# Patient Record
Sex: MALE | Race: BLACK OR AFRICAN AMERICAN | Employment: UNEMPLOYED | URBAN - METROPOLITAN AREA
[De-identification: names, ages, dates, MRNs, and addresses within clinical notes are randomized per-mention and may not be internally consistent; named-entity substitution may affect disease eponyms.]

---
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
## 2023-01-28 ENCOUNTER — PATIENT MESSAGE (OUTPATIENT)
Dept: FAMILY MEDICINE CLINIC | Age: 18
End: 2023-01-28

## 2023-01-28 DIAGNOSIS — B00.9 RECURRENT HERPES SIMPLEX: Primary | ICD-10-CM

## 2023-01-30 RX ORDER — ACYCLOVIR 200 MG/1
200 CAPSULE ORAL
Qty: 25 CAPSULE | Refills: 0 | Status: SHIPPED | OUTPATIENT
Start: 2023-01-30 | End: 2023-02-04

## 2023-01-30 NOTE — TELEPHONE ENCOUNTER
From: Elsie Rosario  To: Dr. Queenie Alan: 1/28/2023 12:41 PM EST  Subject: Eamon Taylor you think its possible for me to get a new prescription of acyclovir or to get it added.  To have on hand dont know if I would need to have a visit but phone visit would be best.

## 2023-02-01 ENCOUNTER — HOSPITAL ENCOUNTER (EMERGENCY)
Age: 18
Discharge: HOME | End: 2023-02-02
Attending: EMERGENCY MEDICINE
Payer: COMMERCIAL

## 2023-02-01 VITALS
RESPIRATION RATE: 20 BRPM | BODY MASS INDEX: 22.32 KG/M2 | OXYGEN SATURATION: 100 % | DIASTOLIC BLOOD PRESSURE: 93 MMHG | HEART RATE: 100 BPM | TEMPERATURE: 98.2 F | WEIGHT: 160 LBS | SYSTOLIC BLOOD PRESSURE: 144 MMHG

## 2023-02-01 DIAGNOSIS — R10.13 ABDOMINAL PAIN, EPIGASTRIC: Primary | ICD-10-CM

## 2023-02-01 DIAGNOSIS — K29.00 ACUTE GASTRITIS WITHOUT HEMORRHAGE, UNSPECIFIED GASTRITIS TYPE: ICD-10-CM

## 2023-02-01 LAB
ABSOLUTE EOS #: 0.07 K/UL (ref 0–0.44)
ABSOLUTE IMMATURE GRANULOCYTE: <0.03 K/UL (ref 0–0.3)
ABSOLUTE LYMPH #: 1.7 K/UL (ref 1.2–5.2)
ABSOLUTE MONO #: 0.92 K/UL (ref 0.1–1.4)
BASOPHILS # BLD: 0 % (ref 0–2)
BASOPHILS ABSOLUTE: 0.03 K/UL (ref 0–0.2)
EOSINOPHILS RELATIVE PERCENT: 1 % (ref 1–4)
HCT VFR BLD AUTO: 47.5 % (ref 40.7–50.3)
HGB BLD-MCNC: 15.3 G/DL (ref 13–17)
IMMATURE GRANULOCYTES: 0 %
LYMPHOCYTES # BLD: 23 % (ref 25–45)
MCH RBC QN AUTO: 26.5 PG (ref 25.2–33.5)
MCHC RBC AUTO-ENTMCNC: 32.2 G/DL (ref 28.4–34.8)
MCV RBC AUTO: 82.2 FL (ref 82.6–102.9)
MONOCYTES # BLD: 12 % (ref 2–8)
NRBC AUTOMATED: 0 PER 100 WBC
PDW BLD-RTO: 13.2 % (ref 11.8–14.4)
PLATELET # BLD AUTO: 237 K/UL (ref 138–453)
PMV BLD AUTO: 10.6 FL (ref 8.1–13.5)
RBC # BLD: 5.78 M/UL (ref 4.21–5.77)
RBC # BLD: ABNORMAL 10*6/UL
SEG NEUTROPHILS: 64 % (ref 34–64)
SEGMENTED NEUTROPHILS ABSOLUTE COUNT: 4.67 K/UL (ref 1.8–8)
WBC # BLD AUTO: 7.4 K/UL (ref 4.5–13.5)

## 2023-02-01 PROCEDURE — 96374 THER/PROPH/DIAG INJ IV PUSH: CPT

## 2023-02-01 PROCEDURE — 83690 ASSAY OF LIPASE: CPT

## 2023-02-01 PROCEDURE — 6360000002 HC RX W HCPCS: Performed by: HEALTH CARE PROVIDER

## 2023-02-01 PROCEDURE — 83735 ASSAY OF MAGNESIUM: CPT

## 2023-02-01 PROCEDURE — 83605 ASSAY OF LACTIC ACID: CPT

## 2023-02-01 PROCEDURE — 96375 TX/PRO/DX INJ NEW DRUG ADDON: CPT

## 2023-02-01 PROCEDURE — 85025 COMPLETE CBC W/AUTO DIFF WBC: CPT

## 2023-02-01 PROCEDURE — 99284 EMERGENCY DEPT VISIT MOD MDM: CPT

## 2023-02-01 PROCEDURE — 80053 COMPREHEN METABOLIC PANEL: CPT

## 2023-02-01 PROCEDURE — 2580000003 HC RX 258: Performed by: HEALTH CARE PROVIDER

## 2023-02-01 RX ORDER — ONDANSETRON 2 MG/ML
4 INJECTION INTRAMUSCULAR; INTRAVENOUS ONCE
Status: COMPLETED | OUTPATIENT
Start: 2023-02-01 | End: 2023-02-01

## 2023-02-01 RX ORDER — SODIUM CHLORIDE, SODIUM LACTATE, POTASSIUM CHLORIDE, AND CALCIUM CHLORIDE .6; .31; .03; .02 G/100ML; G/100ML; G/100ML; G/100ML
1000 INJECTION, SOLUTION INTRAVENOUS ONCE
Status: COMPLETED | OUTPATIENT
Start: 2023-02-01 | End: 2023-02-02

## 2023-02-01 RX ORDER — MORPHINE SULFATE 4 MG/ML
4 INJECTION, SOLUTION INTRAMUSCULAR; INTRAVENOUS ONCE
Status: COMPLETED | OUTPATIENT
Start: 2023-02-01 | End: 2023-02-01

## 2023-02-01 RX ADMIN — ONDANSETRON 4 MG: 2 INJECTION INTRAMUSCULAR; INTRAVENOUS at 23:51

## 2023-02-01 RX ADMIN — SODIUM CHLORIDE, POTASSIUM CHLORIDE, SODIUM LACTATE AND CALCIUM CHLORIDE 1000 ML: 600; 310; 30; 20 INJECTION, SOLUTION INTRAVENOUS at 23:45

## 2023-02-01 RX ADMIN — MORPHINE SULFATE 4 MG: 4 INJECTION INTRAVENOUS at 23:52

## 2023-02-01 ASSESSMENT — PAIN DESCRIPTION - LOCATION: LOCATION: ABDOMEN

## 2023-02-01 ASSESSMENT — PAIN - FUNCTIONAL ASSESSMENT: PAIN_FUNCTIONAL_ASSESSMENT: 0-10

## 2023-02-01 ASSESSMENT — PAIN SCALES - GENERAL: PAINLEVEL_OUTOF10: 7

## 2023-02-02 ENCOUNTER — APPOINTMENT (OUTPATIENT)
Dept: GENERAL RADIOLOGY | Age: 18
End: 2023-02-02
Payer: COMMERCIAL

## 2023-02-02 LAB
ALBUMIN SERPL-MCNC: 4.8 G/DL (ref 3.5–5.2)
ALBUMIN/GLOBULIN RATIO: 1.5 (ref 1–2.5)
ALP SERPL-CCNC: 75 U/L (ref 40–129)
ALT SERPL-CCNC: 13 U/L (ref 5–41)
ANION GAP SERPL CALCULATED.3IONS-SCNC: 12 MMOL/L (ref 9–17)
AST SERPL-CCNC: 19 U/L
BILIRUB SERPL-MCNC: 0.5 MG/DL (ref 0.3–1.2)
BILIRUBIN URINE: NEGATIVE
BUN SERPL-MCNC: 13 MG/DL (ref 6–20)
CALCIUM SERPL-MCNC: 9.3 MG/DL (ref 8.6–10.4)
CASTS UA: NORMAL /LPF (ref 0–8)
CHLORIDE SERPL-SCNC: 105 MMOL/L (ref 98–107)
CO2 SERPL-SCNC: 27 MMOL/L (ref 20–31)
COLOR: YELLOW
CREAT SERPL-MCNC: 1.42 MG/DL (ref 0.7–1.2)
EPITHELIAL CELLS UA: NORMAL /HPF (ref 0–5)
GFR SERPL CREATININE-BSD FRML MDRD: >60 ML/MIN/1.73M2
GLUCOSE SERPL-MCNC: 85 MG/DL (ref 70–99)
GLUCOSE UR STRIP.AUTO-MCNC: NEGATIVE MG/DL
KETONES UR STRIP.AUTO-MCNC: ABNORMAL MG/DL
LACTIC ACID, WHOLE BLOOD: 1.1 MMOL/L (ref 0.7–2.1)
LEUKOCYTE ESTERASE UR QL STRIP.AUTO: NEGATIVE
LIPASE SERPL-CCNC: 19 U/L (ref 13–60)
MAGNESIUM SERPL-MCNC: 1.9 MG/DL (ref 1.6–2.6)
NITRITE UR QL STRIP.AUTO: NEGATIVE
POTASSIUM SERPL-SCNC: 4.2 MMOL/L (ref 3.7–5.3)
PROT SERPL-MCNC: 8.1 G/DL (ref 6.4–8.3)
PROT UR STRIP.AUTO-MCNC: 5.5 MG/DL (ref 5–8)
PROT UR STRIP.AUTO-MCNC: ABNORMAL MG/DL
RBC CLUMPS #/AREA URNS AUTO: NORMAL /HPF (ref 0–4)
SODIUM SERPL-SCNC: 144 MMOL/L (ref 135–144)
SPECIFIC GRAVITY UA: 1.01 (ref 1–1.03)
TURBIDITY: CLEAR
URINE HGB: ABNORMAL
UROBILINOGEN, URINE: NORMAL
WBC UA: NORMAL /HPF (ref 0–5)

## 2023-02-02 PROCEDURE — 71046 X-RAY EXAM CHEST 2 VIEWS: CPT

## 2023-02-02 PROCEDURE — 2580000003 HC RX 258: Performed by: HEALTH CARE PROVIDER

## 2023-02-02 PROCEDURE — A4216 STERILE WATER/SALINE, 10 ML: HCPCS | Performed by: HEALTH CARE PROVIDER

## 2023-02-02 PROCEDURE — 81001 URINALYSIS AUTO W/SCOPE: CPT

## 2023-02-02 PROCEDURE — 6360000002 HC RX W HCPCS: Performed by: HEALTH CARE PROVIDER

## 2023-02-02 PROCEDURE — C9113 INJ PANTOPRAZOLE SODIUM, VIA: HCPCS | Performed by: HEALTH CARE PROVIDER

## 2023-02-02 PROCEDURE — 2500000003 HC RX 250 WO HCPCS: Performed by: HEALTH CARE PROVIDER

## 2023-02-02 RX ORDER — ONDANSETRON 4 MG/1
4 TABLET, ORALLY DISINTEGRATING ORAL EVERY 8 HOURS PRN
Qty: 21 TABLET | Refills: 0 | Status: SHIPPED | OUTPATIENT
Start: 2023-02-02 | End: 2023-02-09

## 2023-02-02 RX ORDER — FAMOTIDINE 10 MG/ML
20 INJECTION, SOLUTION INTRAVENOUS ONCE
Status: COMPLETED | OUTPATIENT
Start: 2023-02-02 | End: 2023-02-02

## 2023-02-02 RX ORDER — FAMOTIDINE 10 MG/ML
INJECTION, SOLUTION INTRAVENOUS
Status: DISCONTINUED
Start: 2023-02-02 | End: 2023-02-02 | Stop reason: HOSPADM

## 2023-02-02 RX ORDER — FAMOTIDINE 20 MG/1
20 TABLET, FILM COATED ORAL 2 TIMES DAILY
Qty: 28 TABLET | Refills: 0 | Status: SHIPPED | OUTPATIENT
Start: 2023-02-02 | End: 2023-02-16

## 2023-02-02 RX ADMIN — FAMOTIDINE 20 MG: 10 INJECTION, SOLUTION INTRAVENOUS at 00:47

## 2023-02-02 RX ADMIN — SODIUM CHLORIDE 40 MG: 9 INJECTION, SOLUTION INTRAMUSCULAR; INTRAVENOUS; SUBCUTANEOUS at 00:05

## 2023-02-02 ASSESSMENT — ENCOUNTER SYMPTOMS
SHORTNESS OF BREATH: 0
DIARRHEA: 0
SORE THROAT: 0
TROUBLE SWALLOWING: 0
BACK PAIN: 0
ABDOMINAL PAIN: 1
NAUSEA: 1
VOMITING: 0
BLOOD IN STOOL: 0
CONSTIPATION: 0

## 2023-02-02 NOTE — ED NOTES
The following labs were labeled with appropriate pt sticker and tubed to lab:     [] Blue     [x] Lavender   [] on ice  [x] Green/yellow  [x] Green/black [x] on ice  [] [place]  [] on ice  [x] Yellow  [] Red  [] Type/ Screen  [] ABG  [] VBG    [] COVID-19 swab    [] Rapid  [] PCR  [] Flu swab  [] Peds Viral Panel     [] Urine Sample  [] Fecal Sample  [] Pelvic Cultures  [] Blood Cultures  [] X 2  [] STREP Cultures       Aaron Cristobal LPN  94/66/01 5458

## 2023-02-02 NOTE — ED NOTES
The following labs were labeled with appropriate pt sticker and tubed to lab:     [] Blue     [] Lavender   [] on ice  [] Green/yellow  [] Green/black [] on ice  [] Iesha Keanu  [] on ice  [] Yellow  [] Red  [] Type/ Screen  [] ABG  [] VBG    [] COVID-19 swab    [] Rapid  [] PCR  [] Flu swab  [] Peds Viral Panel     [x] Urine Sample  [] Fecal Sample  [] Pelvic Cultures  [] Blood Cultures  [] X 2  [] STREP Cultures       Chad Johnson, TEDN  51/82/27 1108

## 2023-02-02 NOTE — ED TRIAGE NOTES
20 yo male arrived to ED with c/o abdominal pain, nausea, and black stool that started on Monday. Pt states he does have a history if ulcers. Pt A&O x 4, does not appear in acute distress, RR even and unlabored, resting comfortably on stretcher with eyes open and call light in reach. Vital signs obtained, medical hx and allergies reviewed with pt. Initial assessment performed by physician, Maciej Moncada will carry out initial orders/tasks and reassess pt.

## 2023-02-02 NOTE — ED NOTES
Pt to [address] [facility] [address] [facility], LPN  59/47/24 8425

## 2023-02-02 NOTE — DISCHARGE INSTRUCTIONS
You are seen in the emergency department due to concerns for abdominal pain. Based on our evaluation, you are safe for discharge. Your symptoms are most likely related to irritation of the stomach lining, called gastritis. Please take your medications exactly as prescribed. Please follow-up with your primary care doctor within 1 week. If you experience worsening pain, nausea, vomiting, fevers, chills, chest pain, shortness of breath, urinary symptoms, bloody stools, or any other symptom you find concerning, please return to the emergency department immediately for reevaluation.

## 2023-02-02 NOTE — ED PROVIDER NOTES
[address] [facility]     Emergency Department     Faculty Attestation    I performed a history and physical examination of the patient and discussed management with the resident. I have reviewed and agree with the residents findings including all diagnostic interpretations, and treatment plans as written. Any areas of disagreement are noted on the chart. I was personally present for the key portions of any procedures. I have documented in the chart those procedures where I was not present during the key portions. I have reviewed the emergency nurses triage note. I agree with the chief complaint, past medical history, past surgical history, allergies, medications, social and family history as documented unless otherwise noted below. Documentation of the HPI, Physical Exam and Medical Decision Making performed by scribcheyanne is based on my personal performance of the HPI, PE and MDM. For Physician Assistant/ Nurse Practitioner cases/documentation I have personally evaluated this patient and have completed at least one if not all key elements of the E/M (history, physical exam, and MDM). Additional findings are as noted. 18 yo M c/o luq pain for 2 days, notes dark stool, not taking nsaid or fe   No fever, c/o nausea but no vomiting,   PE Anali WIGGINS escort for exam: gcs 15 tender luq with guarding, no mass, no distension, no rebound,   Dr Mayito Hernandez heme - rectal    -lipase 19, wbc stable, AN gap stable, s/s improved, tolerating po, given strict return instruction,   Has pcp follow up, I feel pt having gastritis type process    EKG Interpretation    Interpreted by me      CRITICAL CARE: There was a high probability of clinically significant/life threatening deterioration in this patient's condition which required my urgent intervention. Total critical care time was 5 minutes. This excludes any time for separately reportable procedures.        Jan Tanner [address] [facility], DO  02/01/23 [facility], DO  02/01/23 [facility], DO  02/02/23 0140       Claudia Butterfield, DO  02/02/23 9905

## 2023-02-02 NOTE — ED NOTES
Pt back from [address] [facility] [address] Zaina Garrett, LPN  02/62/45 5416

## 2023-02-02 NOTE — PROGRESS NOTES
SPIRITUAL CARE DEPARTMENT - [facility] [address]  PROGRESS NOTE    Shift date: 2/1/23  Shift day: Wednesday   Shift # 2    Room # 06/06   Name: Rl Morales                Taoist:    Place of Amish:     Referral: Routine Visit    Admit Date & Time: 2/1/2023 11:25 PM    Assessment:  Rl Morales is a 19 y.o. male     Intervention:  Writer introduced self and title as . Patient did not appear to mind  presence. Patient appeared calm and coping and had visitor in room for support. Writer offered space for patient  to express feelings, needs, and concerns and provided a ministry presence. Outcome:  Patient appeared receptive to  presence while coping with his current hospital visit. Plan:  Chaplains will remain available to offer spiritual and emotional support as needed.       Electronically signed by Anderson Farfan on 2/2/2023 at 12:18 AM.  [facility]  [phone]

## 2023-02-02 NOTE — ED PROVIDER NOTES
[facility]  ED  Emergency Department Encounter  Emergency Medicine Resident     Pt Name:Ariel Gutierrez  MRN: [id number]  [place] 2005  Date of evaluation: 2/2/23  PCP:  Merari Lindsey MD  Note Started: 12:17 AM EST      CHIEF COMPLAINT       Chief Complaint   Patient presents with    Abdominal Pain    Melena    Nausea       HISTORY OF PRESENT ILLNESS  (Location/Symptom, Timing/Onset, Context/Setting, Quality, Duration, Modifying Factors, Severity.)      Kilo Santos is a 19 y.o. male with history of peptic ulcer disease who presents with abdominal pain. Started 3 days ago. Pain is constant, burning and 7/10 in severity. Localized to the left upper and lower quadrants without radiation. No exacerbating or alleviating factors. Patient states that he has been taking Pepto-Bismol, which has not alleviated the symptoms. Also complains of black stools. States he had black stools previously when he had bleeding ulcer. He is not currently on any PPIs or H2 blockers for his PUD. Also complains of nausea, but no vomiting. He denies any fevers, chills, headache, dizziness, chest pain, shortness of breath, constipation, diarrhea, weakness, numbness, or tingling. PAST MEDICAL / SURGICAL / SOCIAL / FAMILY HISTORY      has a past medical history of Abdominal pain, Acne, and Asthma. has a past surgical history that includes Upper gastrointestinal endoscopy (04/04/2019) and Upper gastrointestinal endoscopy (N/A, 4/4/2019).       Social History     Socioeconomic History    Marital status: Single     Spouse name: Not on file    Number of children: Not on file    Years of education: Not on file    Highest education level: Not on file   Occupational History    Not on file   Tobacco Use    Smoking status: Never     Passive exposure: Yes    Smokeless tobacco: Never   Vaping Use    Vaping Use: Never used   Substance and Sexual Activity    Alcohol use: Never    Drug use: No    Sexual activity: Never   Other Topics Concern    Not on file   Social History Narrative    Not on file     Social Determinants of Health     Financial Resource Strain: Not on file   Food Insecurity: Not on file   Transportation Needs: Not on file   Physical Activity: Insufficiently Active    Days of Exercise per Week: 5 days    Minutes of Exercise per Session: 20 min   Stress: Not on file   Social Connections: Not on file   Intimate Partner Violence: Not At Risk    Fear of Current or Ex-Partner: No    Emotionally Abused: No    Physically Abused: No    Sexually Abused: No   Housing Stability: Not on file       Family History   Problem Relation Age of Onset    No Known Problems Mother     Asthma Father        Allergies:  No known allergies and Seasonal    Home Medications:  Prior to Admission medications    Medication Sig Start Date End Date Taking?  Authorizing Provider   famotidine (PEPCID) 20 MG tablet Take 1 tablet by mouth 2 times daily for 14 days 2/2/23 2/16/23 Yes Zenobia Corcoran MD   ondansetron (ZOFRAN-ODT) 4 MG disintegrating tablet Take 1 tablet by mouth every 8 hours as needed for Nausea or Vomiting 2/2/23 2/9/23 Yes Zenobia Corcoran MD   acyclovir (ZOVIRAX) 200 MG capsule Take 1 capsule by mouth 5 times daily for 5 days 1/30/23 2/4/23  Keyon Bridges MD   docusate sodium (DOK) 100 MG capsule Take 1 capsule by mouth 2 times daily 12/4/22   Erwin Gagnon MD   polyethylene glycol (MIRALAX) 17 GM/SCOOP powder Take 17 g by mouth daily As directed to achieve 2-3 soft stool daily 12/4/22   Erwin Gagnon MD   acetaminophen (TYLENOL) 325 MG tablet Take 1 tablet by mouth every 6 hours as needed for Pain 12/4/22   Erwin Gagnon MD   ibuprofen (IBU) 400 MG tablet Take 1 tablet by mouth every 6 hours as needed for Pain 12/4/22   Erwin Gagnon MD   COVID-19 Antigen Test (QUICKVUE SARS ANTIGEN TEST) KIT Needs home covid 19 test 8/23/22   Keyon Bridges MD   albuterol sulfate HFA (PROVENTIL HFA) 108 (90 Base) MCG/ACT inhaler Inhale 2 puffs into the lungs every 6 hours as needed for Wheezing or Shortness of Breath 8/23/22   Claria Mcardle, MD   doxycycline hyclate (VIBRAMYCIN) 100 MG capsule Take one tablet PO daily 9/21/21   Alize Flores MD   tretinoin (RETIN-A) 0.025 % cream Apply pea sized amount to face nightly 9/21/21   Poli Matthew MD   tacrolimus (PROTOPIC) 0.1 % ointment Apply to affected areas on face twice daily 6/11/21   Poli Matthew MD   erythromycin with ethanol (EMGEL) 2 % gel Apply topically daily. 12/31/19   Marques Matthews MD         REVIEW OF SYSTEMS       Review of Systems   Constitutional:  Positive for appetite change. Negative for chills and fever. HENT:  Negative for sore throat and trouble swallowing. Respiratory:  Negative for shortness of breath. Cardiovascular:  Negative for chest pain. Gastrointestinal:  Positive for abdominal pain and nausea. Negative for blood in stool, constipation, diarrhea and vomiting. Genitourinary:  Negative for decreased urine volume, difficulty urinating and dysuria. Musculoskeletal:  Negative for back pain. Allergic/Immunologic: Negative for immunocompromised state. Neurological:  Negative for dizziness, light-headedness and headaches. Hematological:  Does not bruise/bleed easily. PHYSICAL EXAM      INITIAL VITALS:   BP (!) 144/93   Pulse 100   Temp 98.2 °F (36.8 °C)   Resp 20   Wt 160 lb (72.6 kg)   SpO2 100%   BMI 22.32 kg/m²     Physical Exam  Vitals reviewed. Constitutional:       General: He is not in acute distress. Appearance: Normal appearance. He is not ill-appearing. HENT:      Head: Normocephalic and atraumatic. Right Ear: Tympanic membrane, ear canal and external ear normal.      Left Ear: Tympanic membrane, ear canal and external ear normal.      Nose: Nose normal. No rhinorrhea. Mouth/Throat:      Mouth: Mucous membranes are moist.      Pharynx: Oropharynx is clear.    Eyes: Conjunctiva/sclera: Conjunctivae normal.      Pupils: Pupils are equal, round, and reactive to light. Cardiovascular:      Rate and Rhythm: Normal rate and regular rhythm. Pulses: Normal pulses. Heart sounds: Normal heart sounds. Pulmonary:      Effort: Pulmonary effort is normal.      Breath sounds: Normal breath sounds. Abdominal:      General: There is no distension. Palpations: Abdomen is soft. Tenderness: There is abdominal tenderness in the left upper quadrant. Comments: Moderate left upper quadrant tenderness with voluntary guarding, but no rebound or rigidity. On rectal exam, stool is black, but guaiac is negative. Genitourinary:     Prostate: Normal.      Rectum: Guaiac result negative. No tenderness. Musculoskeletal:      Cervical back: Normal range of motion and neck supple. Right lower leg: No edema. Left lower leg: No edema. Skin:     General: Skin is warm and dry. Capillary Refill: Capillary refill takes less than 2 seconds. Neurological:      General: No focal deficit present. Mental Status: He is alert and oriented to person, place, and time. Psychiatric:         Mood and Affect: Mood normal.         Behavior: Behavior normal.         DDX/DIAGNOSTIC RESULTS / EMERGENCY DEPARTMENT COURSE / MDM     Medical Decision Making  25-year-old male with left upper quadrant abdominal pain and history of peptic ulcer disease. Recurrent ulcer versus gastritis versus pancreatitis versus enteritis, or colitis. Will obtain CMP, CBC, lactate and CBC. Will obtain chest x-ray to evaluate for intra-abdominal free air. Will give morphine for pain, Zofran for nausea. We will give 40 of Protonix and then recheck. Blackened stool with negative black likely secondary to bismuth use. Additional work-up and treatment pending the results of the studies. Amount and/or Complexity of Data Reviewed  Labs: ordered. Radiology: ordered.     Risk  Prescription drug management. EMERGENCY DEPARTMENT COURSE:      ED Course as of 02/02/23 0130   Thu Feb 02, 2023   0128 Patient reports significant improvement of symptoms following Protonix and Pepcid. Does have mild elevation in creatinine, but normal GFR. Likely due to mild dehydration. We will plan for discharge at this time. Will give prescriptions for Pepcid and Zofran and instructed patient to follow-up with his primary care provider within 1 week. Discussed return precautions. Patient acknowledged understanding and agreement with plan. [GG]      ED Course User Index  [GG] Anish Livingston MD       PROCEDURES:  None    CONSULTS:  None    CRITICAL CARE:  None    FINAL IMPRESSION      1. Abdominal pain, epigastric    2.  Acute gastritis without hemorrhage, unspecified gastritis type          DISPOSITION / PLAN     DISPOSITION Decision To Discharge 02/02/2023 01:28:24 AM      PATIENT REFERRED TO:  [facility]  [address] [place] [address]  [phone]    If symptoms worsen    Jordon Barron MD  [address] [place] [address]  [address] [place] [address]  [phone]    In 1 week      DISCHARGE MEDICATIONS:  New Prescriptions    FAMOTIDINE (PEPCID) 20 MG TABLET    Take 1 tablet by mouth 2 times daily for 14 days    ONDANSETRON (ZOFRAN-ODT) 4 MG DISINTEGRATING TABLET    Take 1 tablet by mouth every 8 hours as needed for Nausea or Vomiting       Anish Livingston MD  Emergency Medicine Resident    (Please note that portions of thisnote were completed with a voice recognition program.  Efforts were made to edit the dictations but occasionally words are mis-transcribed.)       Anish Livingston MD  Resident  02/02/23 7318

## 2023-03-03 ENCOUNTER — PATIENT MESSAGE (OUTPATIENT)
Dept: FAMILY MEDICINE CLINIC | Age: 18
End: 2023-03-03

## 2023-03-03 DIAGNOSIS — B00.9 RECURRENT HERPES SIMPLEX: Primary | ICD-10-CM

## 2023-03-04 RX ORDER — VALACYCLOVIR HYDROCHLORIDE 500 MG/1
2000 TABLET, FILM COATED ORAL EVERY 12 HOURS
Qty: 8 TABLET | Refills: 3 | Status: SHIPPED | OUTPATIENT
Start: 2023-03-04

## 2023-03-04 RX ORDER — ACYCLOVIR 50 MG/G
OINTMENT TOPICAL
Qty: 30 G | Refills: 1 | Status: SHIPPED | OUTPATIENT
Start: 2023-03-04

## 2023-03-04 NOTE — TELEPHONE ENCOUNTER
From: Laurent Gray  To: Dr. Alma Rosa Ochoa: 3/3/2023 11:37 PM EST  Subject: Prescription     You think its possible to get an prescription of acyclovir ointment or something stronger. I think this version would be more effective and longer lasting than the other version I would like to have it on hand before the effects.

## 2024-03-18 DIAGNOSIS — J45.20 MILD INTERMITTENT ASTHMA WITHOUT COMPLICATION: ICD-10-CM

## 2024-03-18 DIAGNOSIS — B00.9 RECURRENT HERPES SIMPLEX: ICD-10-CM

## 2024-03-18 RX ORDER — ACYCLOVIR 50 MG/G
OINTMENT TOPICAL
Qty: 30 G | Refills: 1 | OUTPATIENT
Start: 2024-03-18

## 2024-03-18 RX ORDER — ALBUTEROL SULFATE 90 UG/1
2 AEROSOL, METERED RESPIRATORY (INHALATION) EVERY 6 HOURS PRN
Qty: 18 G | Refills: 1 | OUTPATIENT
Start: 2024-03-18

## 2024-03-18 RX ORDER — VALACYCLOVIR HYDROCHLORIDE 500 MG/1
2000 TABLET, FILM COATED ORAL EVERY 12 HOURS
Qty: 8 TABLET | Refills: 3 | OUTPATIENT
Start: 2024-03-18

## 2024-03-25 NOTE — TELEPHONE ENCOUNTER
Please Approve or Refuse.  Send to Pharmacy per Pt's Request: 's     Next Visit Date:  called pt lvm.   Last Visit Date: 8/23/2022    No results found for: \"LABA1C\"          ( goal A1C is < 7)   BP Readings from Last 3 Encounters:   02/01/23 (!) 144/93   12/03/22 (!) 141/90   10/25/22 131/79          (goal 120/80)  BUN   Date Value Ref Range Status   02/01/2023 13 6 - 20 mg/dL Final     Creatinine   Date Value Ref Range Status   02/01/2023 1.42 (H) 0.70 - 1.20 mg/dL Final     Potassium   Date Value Ref Range Status   02/01/2023 4.2 3.7 - 5.3 mmol/L Final

## 2024-09-03 ENCOUNTER — HOSPITAL ENCOUNTER (EMERGENCY)
Age: 19
Discharge: HOME | End: 2024-09-03
Attending: STUDENT IN AN ORGANIZED HEALTH CARE EDUCATION/TRAINING PROGRAM
Payer: COMMERCIAL

## 2024-09-03 VITALS
OXYGEN SATURATION: 99 % | DIASTOLIC BLOOD PRESSURE: 75 MMHG | BODY MASS INDEX: 23.1 KG/M2 | HEART RATE: 63 BPM | TEMPERATURE: 98.4 F | RESPIRATION RATE: 16 BRPM | SYSTOLIC BLOOD PRESSURE: 141 MMHG | WEIGHT: 165 LBS | HEIGHT: 71 IN

## 2024-09-03 DIAGNOSIS — N34.2 URETHRITIS: Primary | ICD-10-CM

## 2024-09-03 LAB
BACTERIA URNS QL MICRO: ABNORMAL
BILIRUB UR QL STRIP: NEGATIVE
CLARITY UR: CLEAR
COLOR UR: YELLOW
EPI CELLS #/AREA URNS HPF: ABNORMAL /HPF (ref 0–5)
GLUCOSE UR STRIP-MCNC: NEGATIVE MG/DL
HGB UR QL STRIP.AUTO: ABNORMAL
KETONES UR STRIP-MCNC: NEGATIVE MG/DL
LEUKOCYTE ESTERASE UR QL STRIP: NEGATIVE
NITRITE UR QL STRIP: NEGATIVE
PH UR STRIP: 7 [PH] (ref 5–8)
PROT UR STRIP-MCNC: NEGATIVE MG/DL
RBC #/AREA URNS HPF: ABNORMAL /HPF (ref 0–2)
SP GR UR STRIP: 1.02 (ref 1–1.03)
UROBILINOGEN UR STRIP-ACNC: NORMAL EU/DL (ref 0–1)
WBC #/AREA URNS HPF: ABNORMAL /HPF (ref 0–5)

## 2024-09-03 PROCEDURE — 81001 URINALYSIS AUTO W/SCOPE: CPT

## 2024-09-03 PROCEDURE — 87591 N.GONORRHOEAE DNA AMP PROB: CPT

## 2024-09-03 PROCEDURE — 99283 EMERGENCY DEPT VISIT LOW MDM: CPT

## 2024-09-03 PROCEDURE — 87491 CHLMYD TRACH DNA AMP PROBE: CPT

## 2024-09-03 RX ORDER — CEPHALEXIN 500 MG/1
500 CAPSULE ORAL 2 TIMES DAILY
Qty: 14 CAPSULE | Refills: 0 | Status: SHIPPED | OUTPATIENT
Start: 2024-09-03 | End: 2024-09-03

## 2024-09-03 RX ORDER — DOXYCYCLINE HYCLATE 100 MG
100 TABLET ORAL 2 TIMES DAILY
Qty: 14 TABLET | Refills: 0 | Status: SHIPPED | OUTPATIENT
Start: 2024-09-03 | End: 2024-09-10

## 2024-09-03 RX ORDER — PHENAZOPYRIDINE HYDROCHLORIDE 200 MG/1
200 TABLET, FILM COATED ORAL 3 TIMES DAILY PRN
Qty: 6 TABLET | Refills: 0 | Status: SHIPPED | OUTPATIENT
Start: 2024-09-03 | End: 2024-09-06

## 2024-09-03 ASSESSMENT — PAIN - FUNCTIONAL ASSESSMENT: PAIN_FUNCTIONAL_ASSESSMENT: 0-10

## 2024-09-03 ASSESSMENT — PAIN SCALES - GENERAL: PAINLEVEL_OUTOF10: 5

## 2024-09-04 LAB
CHLAMYDIA DNA UR QL NAA+PROBE: NEGATIVE
N GONORRHOEA DNA UR QL NAA+PROBE: NEGATIVE
SPECIMEN DESCRIPTION: NORMAL

## 2024-09-04 NOTE — ED PROVIDER NOTES
resources: summary of visit, instructions, follow-up information, prescriptions if necessary.  Patient/ family instructed to read discharge paperwork. All of their questions and concerns were addressed.   Suspicion for any acute life-threatening processes is low.   Patient voices understanding of plan.        DATA FOR LAB AND RADIOLOGY TESTS ORDERED BELOW ARE REVIEWED BY THE ED CLINICIAN:    RADIOLOGY: All x-rays, CT, MRI, and formal ultrasound images (except ED bedside ultrasound) are read by the radiologist, see reports below, unless otherwise noted in MDM or here.  Reports below are reviewed by myself.  No orders to display       LABS: Lab orders shown below, the results are reviewed by myself, and all abnormals are listed below.  Labs Reviewed   URINALYSIS WITH REFLEX TO CULTURE - Abnormal; Notable for the following components:       Result Value    Urine Hgb 2+ (*)     All other components within normal limits   MICROSCOPIC URINALYSIS - Abnormal; Notable for the following components:    Bacteria, UA FEW (*)     All other components within normal limits   C.TRACHOMATIS N.GONORRHOEAE DNA, URINE       Vitals Reviewed:    Vitals:    09/03/24 0545 09/03/24 0546   BP:  (!) 141/75   Pulse: 63    Resp: 16    Temp: 98.4 °F (36.9 °C)    TempSrc: Oral    SpO2: 99%    Weight: 74.8 kg (165 lb)    Height: 1.803 m (5' 11\")      MEDICATIONS GIVEN TO PATIENT THIS ENCOUNTER:  Orders Placed This Encounter   Medications    DISCONTD: cephALEXin (KEFLEX) 500 MG capsule     Sig: Take 1 capsule by mouth 2 times daily for 7 days     Dispense:  14 capsule     Refill:  0    phenazopyridine (PYRIDIUM) 200 MG tablet     Sig: Take 1 tablet by mouth 3 times daily as needed for Pain (bladder spasm/pain)     Dispense:  6 tablet     Refill:  0    doxycycline hyclate (VIBRA-TABS) 100 MG tablet     Sig: Take 1 tablet by mouth 2 times daily for 7 days     Dispense:  14 tablet     Refill:  0     DISCHARGE PRESCRIPTIONS:  Discharge Medication List

## 2024-10-05 DIAGNOSIS — B00.9 RECURRENT HERPES SIMPLEX: ICD-10-CM

## 2024-10-07 ENCOUNTER — PATIENT MESSAGE (OUTPATIENT)
Dept: FAMILY MEDICINE CLINIC | Age: 19
End: 2024-10-07

## 2024-10-07 DIAGNOSIS — B00.9 RECURRENT HERPES SIMPLEX: ICD-10-CM

## 2024-10-07 RX ORDER — ACYCLOVIR 50 MG/G
OINTMENT TOPICAL
Qty: 30 G | Refills: 1 | OUTPATIENT
Start: 2024-10-07

## 2024-10-07 RX ORDER — ACYCLOVIR 50 MG/G
OINTMENT TOPICAL
Qty: 30 G | Refills: 1 | Status: SHIPPED | OUTPATIENT
Start: 2024-10-07

## 2024-10-07 RX ORDER — VALACYCLOVIR HYDROCHLORIDE 500 MG/1
2000 TABLET, FILM COATED ORAL EVERY 12 HOURS
Qty: 8 TABLET | Refills: 3 | OUTPATIENT
Start: 2024-10-07

## 2024-10-07 RX ORDER — VALACYCLOVIR HYDROCHLORIDE 500 MG/1
2000 TABLET, FILM COATED ORAL EVERY 12 HOURS
Qty: 8 TABLET | Refills: 3 | Status: SHIPPED | OUTPATIENT
Start: 2024-10-07

## 2024-10-07 NOTE — TELEPHONE ENCOUNTER
Please Approve or Refuse.  [facility] to [facility]     Next Visit Date:  2/7/2025   Last Visit Date: 8/23/2022    No results found for: \"LABA1C\"          ( goal A1C is < 7)   BP Readings from Last 3 Encounters:   09/03/24 (!) 141/75   02/01/23 (!) 144/93   12/03/22 (!) 141/90          (goal 120/80)  BUN   Date Value Ref Range Status   02/01/2023 13 6 - 20 mg/dL Final     Creatinine   Date Value Ref Range Status   02/01/2023 1.42 (H) 0.70 - 1.20 mg/dL Final     Potassium   Date Value Ref Range Status   02/01/2023 4.2 3.7 - 5.3 mmol/L Final

## 2024-10-07 NOTE — TELEPHONE ENCOUNTER
Diagnosis Orders   1. Recurrent herpes simplex  acyclovir (ZOVIRAX) 5 % ointment    valACYclovir (VALTREX) 500 MG tablet           Future Appointments   Date Time Provider Department Center   2/7/2025  9:15 AM Ankita Bonner MD fp sc [facility] DEP

## 2025-01-09 ENCOUNTER — HOSPITAL ENCOUNTER (EMERGENCY)
Age: 20
Discharge: HOME | End: 2025-01-09
Attending: EMERGENCY MEDICINE
Payer: COMMERCIAL

## 2025-01-09 VITALS
BODY MASS INDEX: 26.46 KG/M2 | DIASTOLIC BLOOD PRESSURE: 88 MMHG | HEART RATE: 78 BPM | HEIGHT: 71 IN | OXYGEN SATURATION: 98 % | WEIGHT: 189 LBS | TEMPERATURE: 97.9 F | RESPIRATION RATE: 18 BRPM | SYSTOLIC BLOOD PRESSURE: 150 MMHG

## 2025-01-09 DIAGNOSIS — R10.12 ABDOMINAL PAIN, LEFT UPPER QUADRANT: Primary | ICD-10-CM

## 2025-01-09 LAB
ALBUMIN SERPL-MCNC: 4.2 G/DL (ref 3.5–5.2)
ALBUMIN/GLOB SERPL: 1.5 {RATIO} (ref 1–2.5)
ALP SERPL-CCNC: 51 U/L (ref 40–129)
ALT SERPL-CCNC: 15 U/L (ref 10–50)
ANION GAP SERPL CALCULATED.3IONS-SCNC: 11 MMOL/L (ref 9–16)
AST SERPL-CCNC: 20 U/L (ref 10–50)
BASOPHILS # BLD: <0.03 K/UL (ref 0–0.2)
BASOPHILS NFR BLD: 0 % (ref 0–2)
BILIRUB SERPL-MCNC: 0.4 MG/DL (ref 0–1.2)
BUN SERPL-MCNC: 13 MG/DL (ref 6–20)
CALCIUM SERPL-MCNC: 9 MG/DL (ref 8.6–10.4)
CHLORIDE SERPL-SCNC: 103 MMOL/L (ref 98–107)
CO2 SERPL-SCNC: 23 MMOL/L (ref 20–31)
CREAT SERPL-MCNC: 1.1 MG/DL (ref 0.7–1.2)
EOSINOPHIL # BLD: 0.11 K/UL (ref 0–0.44)
EOSINOPHILS RELATIVE PERCENT: 2 % (ref 1–4)
ERYTHROCYTE [DISTWIDTH] IN BLOOD BY AUTOMATED COUNT: 13 % (ref 11.8–14.4)
GFR, ESTIMATED: >90 ML/MIN/1.73M2
GLUCOSE SERPL-MCNC: 85 MG/DL (ref 74–99)
HCT VFR BLD AUTO: 44.1 % (ref 40.7–50.3)
HGB BLD-MCNC: 14.4 G/DL (ref 13–17)
IMM GRANULOCYTES # BLD AUTO: 0.03 K/UL (ref 0–0.3)
IMM GRANULOCYTES NFR BLD: 1 %
LIPASE SERPL-CCNC: 27 U/L (ref 13–60)
LYMPHOCYTES NFR BLD: 1.31 K/UL (ref 1.1–3.7)
LYMPHOCYTES RELATIVE PERCENT: 21 % (ref 25–45)
MCH RBC QN AUTO: 27.1 PG (ref 25.2–33.5)
MCHC RBC AUTO-ENTMCNC: 32.7 G/DL (ref 28.4–34.8)
MCV RBC AUTO: 83.1 FL (ref 82.6–102.9)
MONOCYTES NFR BLD: 0.77 K/UL (ref 0.1–1.4)
MONOCYTES NFR BLD: 12 % (ref 2–8)
NEUTROPHILS NFR BLD: 64 % (ref 34–64)
NEUTS SEG NFR BLD: 4.02 K/UL (ref 1.5–8.1)
NRBC BLD-RTO: 0 PER 100 WBC
PLATELET # BLD AUTO: 214 K/UL (ref 138–453)
PMV BLD AUTO: 10.3 FL (ref 8.1–13.5)
POTASSIUM SERPL-SCNC: 4 MMOL/L (ref 3.7–5.3)
PROT SERPL-MCNC: 7.1 G/DL (ref 6.6–8.7)
RBC # BLD AUTO: 5.31 M/UL (ref 4.21–5.77)
SODIUM SERPL-SCNC: 138 MMOL/L (ref 136–145)
WBC OTHER # BLD: 6.3 K/UL (ref 4.5–13.5)

## 2025-01-09 PROCEDURE — 80053 COMPREHEN METABOLIC PANEL: CPT

## 2025-01-09 PROCEDURE — 99283 EMERGENCY DEPT VISIT LOW MDM: CPT

## 2025-01-09 PROCEDURE — 83690 ASSAY OF LIPASE: CPT

## 2025-01-09 PROCEDURE — 85025 COMPLETE CBC W/AUTO DIFF WBC: CPT

## 2025-01-09 PROCEDURE — 6370000000 HC RX 637 (ALT 250 FOR IP): Performed by: EMERGENCY MEDICINE

## 2025-01-09 RX ORDER — FAMOTIDINE 20 MG/1
20 TABLET, FILM COATED ORAL 2 TIMES DAILY
Qty: 60 TABLET | Refills: 0 | Status: SHIPPED | OUTPATIENT
Start: 2025-01-09

## 2025-01-09 RX ADMIN — LIDOCAINE HYDROCHLORIDE: 20 SOLUTION ORAL at 19:36

## 2025-01-09 ASSESSMENT — PAIN SCALES - GENERAL: PAINLEVEL_OUTOF10: 6

## 2025-01-09 ASSESSMENT — ENCOUNTER SYMPTOMS: ABDOMINAL PAIN: 1

## 2025-01-09 ASSESSMENT — LIFESTYLE VARIABLES
HOW OFTEN DO YOU HAVE A DRINK CONTAINING ALCOHOL: NEVER
HOW MANY STANDARD DRINKS CONTAINING ALCOHOL DO YOU HAVE ON A TYPICAL DAY: PATIENT DOES NOT DRINK

## 2025-01-09 ASSESSMENT — PAIN - FUNCTIONAL ASSESSMENT: PAIN_FUNCTIONAL_ASSESSMENT: 0-10

## 2025-01-09 ASSESSMENT — PAIN DESCRIPTION - LOCATION: LOCATION: ABDOMEN

## 2025-01-10 NOTE — ED PROVIDER NOTES
EMERGENCY DEPARTMENT ENCOUNTER    Pt Name: Ariel Richardson  MRN: [id number]  Birthdate 2005  Date of evaluation: 1/9/25  CHIEF COMPLAINT       Chief Complaint   Patient presents with    Abdominal Pain     LUQ pain that started yesterday, diarrhea that started today. Pt denies vomiting. Pt states pain is worse after eating. Pt denies urinary complaints.      HISTORY OF PRESENT ILLNESS   19 male presents emergency room for left upper quadrant abdominal pain.  Pain started earlier today.  Patient denies nausea or vomiting.  Patient stated to me that he did not really notice that the pain was worse after eating.  He has never really had pain like this before.  He has no major medical problems.  No urinary symptoms.             REVIEW OF SYSTEMS     Review of Systems   Gastrointestinal:  Positive for abdominal pain.     PASTMEDICAL HISTORY     Past Medical History:   Diagnosis Date    Abdominal pain     Acne     Asthma 2011    INHALER USE AS NEEDED     Past Problem List  Patient Active Problem List   Diagnosis Code    Other acne L70.8    Chronic allergic rhinitis J30.9    Chronic constipation K59.09    Mild intermittent asthma without complication J45.20     SURGICAL HISTORY       Past Surgical History:   Procedure Laterality Date    UPPER GASTROINTESTINAL ENDOSCOPY  04/04/2019    biopsy    UPPER GASTROINTESTINAL ENDOSCOPY N/A 4/4/2019    EGD BIOPSY performed by Nikolas Pereira MD at [facility] OR     CURRENT MEDICATIONS       Discharge Medication List as of 1/9/2025  8:59 PM        CONTINUE these medications which have NOT CHANGED    Details   acyclovir (ZOVIRAX) 5 % ointment Apply topically 5 times daily FOR 4 DAYS, Disp-30 g, R-1, Normal      valACYclovir (VALTREX) 500 MG tablet Take 4 tablets by mouth in the morning and 4 tablets in the evening. For 1 days, start at beginning of flare ups., Disp-8 tablet, R-3Normal      docusate sodium (DOK) 100 MG capsule Take 1 capsule by mouth 2 times daily, Disp-60 capsule, R-3Print

## 2025-01-10 NOTE — ED NOTES
Pt medicated as ordered and documented. Aware of wait for results. Denies further needs or additional complaints at this time.

## 2025-01-10 NOTE — DISCHARGE INSTRUCTIONS
I recommend use of the famotidine twice per day.  I recommend avoiding coffee alcohol spicy foods foods with a lot of garlic and red sauce.  Small meals are favored over large meals.  Do not lay down within 2 hours of eating.  Avoid anti-inflammatories.  Take this medication allow 1 to 2 weeks for improvement.  If symptoms do not improve I highly recommend follow-up with your doctor.

## 2025-01-10 NOTE — ED NOTES
Pt reports onset yesterday of LUQ pain after eating Erasto food - reports he has had same food previously without any issues. C/o nausea without emesis as well as \"mild diarrhea today\". States he hasn't eaten much today, but has been staying hydrated with water and liquid IV. Bowel sounds active in all quadrants.

## 2025-01-31 ASSESSMENT — PATIENT HEALTH QUESTIONNAIRE - PHQ9
SUM OF ALL RESPONSES TO PHQ QUESTIONS 1-9: 0
SUM OF ALL RESPONSES TO PHQ QUESTIONS 1-9: 0
2. FEELING DOWN, DEPRESSED OR HOPELESS: NOT AT ALL
1. LITTLE INTEREST OR PLEASURE IN DOING THINGS: NOT AT ALL
2. FEELING DOWN, DEPRESSED OR HOPELESS: NOT AT ALL
SUM OF ALL RESPONSES TO PHQ QUESTIONS 1-9: 0
SUM OF ALL RESPONSES TO PHQ QUESTIONS 1-9: 0
SUM OF ALL RESPONSES TO PHQ9 QUESTIONS 1 & 2: 0
SUM OF ALL RESPONSES TO PHQ9 QUESTIONS 1 & 2: 0
1. LITTLE INTEREST OR PLEASURE IN DOING THINGS: NOT AT ALL

## 2025-02-02 ENCOUNTER — PATIENT MESSAGE (OUTPATIENT)
Dept: FAMILY MEDICINE CLINIC | Age: 20
End: 2025-02-02

## 2025-02-02 DIAGNOSIS — B00.9 RECURRENT HERPES SIMPLEX: ICD-10-CM

## 2025-02-02 RX ORDER — VALACYCLOVIR HYDROCHLORIDE 500 MG/1
2000 TABLET, FILM COATED ORAL EVERY 12 HOURS
Qty: 8 TABLET | Refills: 3 | Status: SHIPPED | OUTPATIENT
Start: 2025-02-02

## 2025-02-02 RX ORDER — ACYCLOVIR 50 MG/G
OINTMENT TOPICAL
Qty: 30 G | Refills: 1 | Status: SHIPPED | OUTPATIENT
Start: 2025-02-02

## 2025-02-07 ENCOUNTER — OFFICE VISIT (OUTPATIENT)
Dept: FAMILY MEDICINE CLINIC | Age: 20
End: 2025-02-07

## 2025-02-07 VITALS
SYSTOLIC BLOOD PRESSURE: 126 MMHG | WEIGHT: 186.6 LBS | TEMPERATURE: 98.6 F | BODY MASS INDEX: 26.12 KG/M2 | HEIGHT: 71 IN | HEART RATE: 80 BPM | DIASTOLIC BLOOD PRESSURE: 80 MMHG | OXYGEN SATURATION: 96 %

## 2025-02-07 DIAGNOSIS — Z11.59 ENCOUNTER FOR SCREENING FOR OTHER VIRAL DISEASES: ICD-10-CM

## 2025-02-07 DIAGNOSIS — J45.20 MILD INTERMITTENT ASTHMA WITHOUT COMPLICATION: ICD-10-CM

## 2025-02-07 DIAGNOSIS — K52.9 CHRONIC DIARRHEA: ICD-10-CM

## 2025-02-07 DIAGNOSIS — Z00.00 ENCOUNTER FOR WELL ADULT EXAM WITHOUT ABNORMAL FINDINGS: Primary | ICD-10-CM

## 2025-02-07 DIAGNOSIS — Z23 ENCOUNTER FOR IMMUNIZATION: ICD-10-CM

## 2025-02-07 DIAGNOSIS — Z13.6 SCREENING FOR CARDIOVASCULAR CONDITION: ICD-10-CM

## 2025-02-07 RX ORDER — ALBUTEROL SULFATE 90 UG/1
2 INHALANT RESPIRATORY (INHALATION) EVERY 6 HOURS PRN
Qty: 8 G | Refills: 1 | Status: SHIPPED | OUTPATIENT
Start: 2025-02-07

## 2025-02-07 SDOH — ECONOMIC STABILITY: FOOD INSECURITY: WITHIN THE PAST 12 MONTHS, THE FOOD YOU BOUGHT JUST DIDN'T LAST AND YOU DIDN'T HAVE MONEY TO GET MORE.: NEVER TRUE

## 2025-02-07 SDOH — ECONOMIC STABILITY: FOOD INSECURITY: WITHIN THE PAST 12 MONTHS, YOU WORRIED THAT YOUR FOOD WOULD RUN OUT BEFORE YOU GOT MONEY TO BUY MORE.: NEVER TRUE

## 2025-02-07 ASSESSMENT — ENCOUNTER SYMPTOMS
SHORTNESS OF BREATH: 0
ABDOMINAL DISTENTION: 0
CONSTIPATION: 0
CHEST TIGHTNESS: 0
NAUSEA: 0
BACK PAIN: 0
COUGH: 0
WHEEZING: 0
ABDOMINAL PAIN: 1
DIARRHEA: 1
VOMITING: 0

## 2025-02-07 NOTE — PROGRESS NOTES
Well Adult Note  Name: Ariel Richardson Today’s Date: 2025   MRN: [id number] Sex: Male   Age: 20 y.o. Ethnicity: Non- / Non    : 2005 Race: Black /       Ariel Richardson is here for a well adult exam.       Assessment & Plan   Encounter for well adult exam without abnormal findings  Low carb, low fat diet, increase fruits and vegetables, and exercise 4-5 times a week 30-40 minutes a day, or walk 1-2 hours per day, or wear a pedometer and get at least 10,000 steps per day.  Lipid panel and hepatitis C screening ordered  To establish with dentist  Continue follow-up with ophthalmologist once a year, he does have glasses      Chronic diarrhea  Chronic, intermittent, about once a week, depending on the food he eats  Food diary discussed  He has been avoiding cow milk, ice cream  Will also rule out hyperthyroidism  To stop taking hyper vitamin C and multivitamins  To only continue with fish oil, and vitamin D supplement  He is okay to eat cheese.  He did have celiac panel checked 2019 and it was negative  He did see GI before and had workup  -     TSH; Future  -     Allergen, Food, Common 10; Future  -     ESTABLISHED, LOW MDM, 20-29 MIN [[id number]]  Mild intermittent asthma without complication  Chronic, stable  Albuterol as needed, counseling given regarding correct use of inhalers  Immunizations discussed, she is agreeable to get pneumonia vaccine.  Advised to get the flu shot at the pharmacy in about 1 week, and tetanus vaccine in about 4 to 5 weeks    -     albuterol sulfate HFA (PROVENTIL HFA) 108 (90 Base) MCG/ACT inhaler; Inhale 2 puffs into the lungs every 6 hours as needed for Wheezing or Shortness of Breath (coughing spells) Okay to substitute, Disp-8 g, R-1Normal  -     ESTABLISHED, LOW MDM, 20-29 MIN [55331]  Encounter for immunization  -     Pneumococcal, PCV20, PREVNAR 20, (age 6w+), IM, PF  Encounter for screening for other viral diseases  -     Hepatitis C

## 2025-02-07 NOTE — PROGRESS NOTES
Chief Complaint   Patient presents with    Well Child     14years old       HPI    Stevie Mclaughlin is a 14 y.o. male who presents for a well visit. Pt has concerns, would like to discuss that with physician    HISTORIAN: patient/parent  DIET HISTORY:  Appetite? fair   Milk? 8 oz/day   Juice/pop? 22 oz/day   Meats? many   Fruits? many   Vegetables? many   Junk Food? few   Portion sizes? large   Intolerances? no   Takes vitamins or supplements? yes    DENTAL HISTORY:   Brushes teeth twice daily? yes   Flosses teeth? no    Has regular dental visits? yes    ELIMINATION HISTORY:   Urinates at least 5-6 times/day? yes   Has at least one bowel movement/day? yes   Has soft bowel movements? yes    SLEEP HISTORY:  Sleep Pattern: no sleep issues     Problems? no    EDUCATION HISTORY:  School: [employer]:th th9th Type of Student: good  Has an IEP, 504 plan, or gets extra help in any area? no  Receives OT, PT, and/or speech therapy? no  Sees a counselor? no  Socializes well with peers? yes  Has behavioral or attention problems? no  Extracurricular Activities: none  Has a job? no    SOCIAL:   Has a boyfriend or girlfriend? no   Uses drugs, alcohol, or tobacco? no   Feels sad or depressed? no   Has thoughts about hurting self or others? no    SAFETY:   Usually uses sunscreen? no   Has trouble dealing with conflict/violence? no   Knows about gun safety? yes   Has more than 2 hrs of tv/computer time per day? yes   Wears a seatbelt? yes    Visit Information    Have you changed or started any medications since your last visit including any over-the-counter medicines, vitamins, or herbal medicines? no   Are you having any side effects from any of your medications? -  no  Have you stopped taking any of your medications? Is so, why? -  no    Have you seen any other physician or provider since your last visit? No  Have you had any other diagnostic tests since your last visit?  No  Have you been seen in the emergency room and/or had an Statement Selected admission to a hospital since we last saw you? No  Have you had your routine dental cleaning in the past 6 months? no    Have you activated your MyChart account? If not, what are your barriers? No: declined     Patient Care Team:  Blanca Sanchez MD as PCP - General (Pediatrics)  Blanca Sanchez MD as PCP - [facility] Provider    Medical History Review  Past Medical, Family, and Social History reviewed and does not contribute to the patient presenting condition    Health Maintenance   Topic Date Due    Pneumococcal 0-64 years Vaccine (1 of 1 - PPSV23) 01/31/2009    Flu vaccine (1) 09/01/2019    DTaP/Tdap/Td vaccine (6 - Td) 09/09/2024    Hepatitis A vaccine  Completed    Hepatitis B vaccine  Completed    Hib vaccine  Completed    HPV vaccine  Completed    Polio vaccine  Completed    Measles,Mumps,Rubella (MMR) vaccine  Completed    Varicella vaccine  Completed    Meningococcal (ACWY) vaccine  Completed    HIV screen  Completed     ROS  Constitutional:  Denies fever or chills   Eyes:  Denies change in visual acuity, eye pain, or drainage   HENT:  Denies nasal congestion or sore throat   Respiratory:  Denies cough or shortness of breath   Cardiovascular:  Denies chest pain or edema   GI:  Denies abdominal pain, nausea, vomiting, bloody stools or diarrhea   :  Denies dysuria or hematuria  Musculoskeletal:  Denies back pain or joint pain   Integument:  Denies itching or rash  Neurologic:  Denies headache, focal weakness or sensory changes   Endocrine:  Denies polyuria or polydipsia   Lymphatic:  Denies swollen glands   Psychiatric:  Denies depression or anxiety   Hearing: No Concerns    Current Outpatient Medications on File Prior to Visit   Medication Sig Dispense Refill    acyclovir (ZOVIRAX) 5 % ointment Apply topically every 3 hours.  30 g 2    docusate sodium (DOK) 100 MG capsule Take 1 capsule by mouth 2 times daily 60 capsule 3    bisacodyl (BISACODYL) 5 MG EC tablet Take 2 tablets by mouth Daily 60 tablet 3    Probiotic Product (PROBIOTIC & ACIDOPHILUS EX ST) CAPS Take 1 each by mouth daily 30 capsule 3    erythromycin with ethanol (EMGEL) 2 % gel Apply topically daily. 30 g 1    albuterol sulfate HFA (PROAIR HFA) 108 (90 Base) MCG/ACT inhaler Inhale 2 puffs into the lungs every 6 hours as needed for Wheezing 1 Inhaler 1    benzoyl peroxide 5 % gel Apply topically daily. 28 g 1    acetaminophen (TYLENOL) 500 MG tablet Take 1 tablet by mouth 4 times daily as needed for Pain 40 tablet 1    dicyclomine (BENTYL) 10 MG capsule TAKE 1 CAPSULE BY MOUTH AS NEEDED FOR ABDOMINAL PAIN 30 capsule 1    magnesium citrate solution TAKE 296 ML BY MOUTH ONCE FOR 1 DOSE 296 mL 0    sodium phosphate (FLEET) 3.5-9.5 GM/59ML enema Place 1 enema rectally once a week Please give once a week in the morning after 2 chewable exlax were given the night before 2 enema 1    omeprazole (PRILOSEC) 20 MG delayed release capsule Take 1 capsule by mouth daily 30 capsule 3    polyethylene glycol (MIRALAX) powder Take 17 g by mouth daily As directed to achieve 2-3 soft stool daily 850 g 5    senna (SENOKOT) 8.6 MG TABS tablet Take 2 tablets by mouth daily 120 tablet 3     No current facility-administered medications on file prior to visit.         Allergies   Allergen Reactions    Seasonal        Patient Active Problem List    Diagnosis Date Noted    Chronic nausea     Reactive airway disease 11/15/2017    Epigastric pain 11/15/2017    Other acne 11/15/2017    Chronic allergic rhinitis 11/15/2017       Past Medical History:   Diagnosis Date    40 weeks gestation of pregnancy 2003    VAGINAL BIRTH, UNKNOWN BIRTH WEIGHT NO COMPLICATIONS    Abdominal pain     Acne     Asthma 2011    INHALER USE AS NEEDED       Family History   Problem Relation Age of Onset    No Known Problems Mother     Asthma Father        No data recorded    PHYSICAL EXAM    VITAL SIGNS:Blood pressure (!) 152/76, pulse 74, height 5' 8.7\" without difficulty; no knee pain or flat feet; and normal active motion. No tenderness to palpation or major deformities noted. No scoliosis noted. Neurologic: good tone and normal strength in all four extemities. Deep tendon reflexes 2+ bilaterally at patella and biceps. No results found for this visit on 02/21/20. No exam data present    Immunization History   Administered Date(s) Administered    DTP 08/05/2008    DTaP (Infanrix) 03/24/2005, 10/10/2006, 10/15/2007    HPV 9-valent Gretchen Crick) 04/18/2016    HPV Quadrivalent (Gardasil) 09/09/2014, 09/09/2015    Hepatitis A 12/19/2013, 09/09/2014    Hepatitis B 2003, 03/24/2005, 09/09/2014    Hib, unspecified 10/10/2006    Influenza Virus Vaccine 08/05/2008, 11/21/2012    Influenza, Quadv, IM, PF (6 mo and older Fluzone, Flulaval, Fluarix, and 3 yrs and older Afluria) 11/15/2017    MMR 03/24/2005, 08/05/2008    Meningococcal B, OMV (Bexsero) 02/21/2020    Meningococcal MCV4P (Menactra) 09/09/2014, 02/21/2020    Polio IPV (IPOL) 03/24/2005, 10/10/2006, 10/15/2007, 08/05/2008    Tdap (Boostrix, Adacel) 09/09/2014    Varicella (Varivax) 03/24/2005, 08/05/2008          Assessment    1. 14 Year Well Visit-following along nicely on growth curves and developing well without behavioral concerns. Diagnosis Orders   1. Encounter for routine child health examination with abnormal findings     2. Screen for STD (sexually transmitted disease)  Chlamydia/GC DNA, Urine   3. Recurrent herpes labialis  Shadia Pope MD, Pediatric Infectious, Disease, [place]   4. Other Hal Jacobsen MD, Pediatric Dermatology, [facility] ON THE [facility]  Discussed the importance of monthly testicular self exams. Advised about abstinence and safe sex, as well as the dangers of peer pressure. Also, talked about the need for a well-balanced, healthy diet and regular exercise. Patient is to call with any questions or concerns.     Anticipatory guidance reviewed: Written instructions given    Discussed Nutrition: Body mass index is 25.83 kg/m². Normal.    Weight control planned discussed Healthy diet and regular exercise. Discussed regular exercise. intermittently   Smoke exposure: none  Asthma history:  Yes   Diabetes risk:  No      Patient and/or parent given educational materials - see patient instructions  Was a self-tracking handout given in paper form or via Parental [employer]? Yes  Continue routine health care follow up. All patient and/or parent questions answered and voiced understanding. Requested Prescriptions      No prescriptions requested or ordered in this encounter     Follow-up visit in 1 year for next well child visit or call sooner if needed.         Orders Placed This Encounter   Procedures    Chlamydia/GC DNA, Urine     Standing Status:   Future     Standing Expiration Date:   2/21/2021    Meningococcal MCV4P (age 7m-55y) IM (Menactra)    Meningococcal B, OMV (age 6y-22y) IM (Nancy Beat)   Queta Calhoun MD, Pediatric Infectious, Disease, [place]     Referral Priority:   Routine     Referral Type:   Eval and Treat     Referral Reason:   Specialty Services Required     Referred to Provider:   Felicita Epley, MD     Requested Specialty:   Pediatric Infectious Disease     Number of Visits Requested:   Bruce Canchola MD, Pediatric Dermatology, [place]     Referral Priority:   Routine     Referral Type:   Eval and Treat     Referral Reason:   Specialty Services Required     Referred to Provider:   Aldo Ramos MD     Requested Specialty:   Pediatric Dermatology     Number of Visits Requested:   1    I have reviewed and agree with my clinical staff documentation

## 2025-02-07 NOTE — PROGRESS NOTES
Visit Information    Have you changed or started any medications since your last visit including any over-the-counter medicines, vitamins, or herbal medicines? no   Have you stopped taking any of your medications? Is so, why? -  yes -   Are you having any side effects from any of your medications? - no    Have you seen any other physician or provider since your last visit?  yes  Have you had any other diagnostic tests since your last visit?  no   Have you been seen in the emergency room and/or had an admission in a hospital since we last saw you?  yes -    Have you had your routine dental cleaning in the past 6 months?  no     Do you have an active MyChart account? If no, what is the barrier?  Yes    Patient Care Team:  Anikta Bonner MD as PCP - General (Family Medicine)  Ankita Bonner MD as PCP - Empaneled Provider  Karol Flores MD as Consulting Physician (Dermatology)    Medical History Review  Past Medical, Family, and Social History reviewed and does contribute to the patient presenting condition    Health Maintenance   Topic Date Due    Hepatitis C screen  Never done    Pneumococcal 0-49 years Vaccine (1 of 2 - PCV) 01/31/2022    Flu vaccine (1) 08/01/2024    COVID-19 Vaccine (1 - 2024-25 season) Never done    DTaP/Tdap/Td vaccine (6 - Td or Tdap) 09/09/2024    Depression Screen  01/31/2026    Hepatitis A vaccine  Completed    Hepatitis B vaccine  Completed    Hib vaccine  Completed    HPV vaccine  Completed    Polio vaccine  Completed    Varicella vaccine  Completed    Meningococcal (ACWY) vaccine  Completed    HIV screen  Completed    Measles,Mumps,Rubella (MMR) vaccine  Discontinued

## 2025-02-10 ENCOUNTER — HOSPITAL ENCOUNTER (OUTPATIENT)
Age: 20
Discharge: HOME | End: 2025-02-10
Payer: COMMERCIAL

## 2025-02-10 DIAGNOSIS — Z11.59 ENCOUNTER FOR SCREENING FOR OTHER VIRAL DISEASES: ICD-10-CM

## 2025-02-10 DIAGNOSIS — Z13.6 SCREENING FOR CARDIOVASCULAR CONDITION: ICD-10-CM

## 2025-02-10 DIAGNOSIS — K52.9 CHRONIC DIARRHEA: ICD-10-CM

## 2025-02-10 LAB
CHOLEST SERPL-MCNC: 184 MG/DL (ref 0–199)
CHOLESTEROL/HDL RATIO: 2.2
HCV AB SERPL QL IA: NONREACTIVE
HDLC SERPL-MCNC: 84 MG/DL
LDLC SERPL CALC-MCNC: 87 MG/DL (ref 0–100)
TRIGL SERPL-MCNC: 66 MG/DL
TSH SERPL DL<=0.05 MIU/L-ACNC: 1.44 UIU/ML (ref 0.27–4.2)
VLDLC SERPL CALC-MCNC: 13 MG/DL (ref 1–30)

## 2025-02-10 PROCEDURE — 86803 HEPATITIS C AB TEST: CPT

## 2025-02-10 PROCEDURE — 80061 LIPID PANEL: CPT

## 2025-02-10 PROCEDURE — 84443 ASSAY THYROID STIM HORMONE: CPT

## 2025-02-10 PROCEDURE — 36415 COLL VENOUS BLD VENIPUNCTURE: CPT

## 2025-02-10 PROCEDURE — 86003 ALLG SPEC IGE CRUDE XTRC EA: CPT

## 2025-02-10 NOTE — RESULT ENCOUNTER NOTE
Please notify patient: Normal labs, hepatitis C screening negative, lipids normal, thyroid function normal  pending only allergy panel    Future Appointments  2/10/2026  9:15 AM    Ankita Bonner MD    [facility] DEP

## 2025-02-11 LAB
BANANA IGE QN: 0.1 KU/L (ref 0–0.34)
CORN IGE QN: <0.1 KU/L (ref 0–0.34)
COW MILK IGE QN: <0.1 KU/L (ref 0–0.34)
EGG YOLK IGE QN: <0.1 KU/L (ref 0–0.34)
GLUTEN IGE QN: <0.1 KU/L (ref 0–0.34)
PEANUT IGE QN: <0.1 KU/L (ref 0–0.34)
SOYBEAN IGE QN: 0.11 KU/L (ref 0–0.34)
STRAWBERRY IGE QN: <0.1 KU/L (ref 0–0.34)
WHEAT IGE QN: 0.1 KU/L (ref 0–0.34)
WHOLE EGG IGE QN: <0.1 KU/L (ref 0–0.34)

## 2025-02-11 NOTE — RESULT ENCOUNTER NOTE
Please notify patient: Most common 10 foods testing, he is not allergic to those, specifically not allergic to milk, whole egg, peanuts, soybean, wheat, corn, egg yolk, gluten, banana and strawberries     Normal labs, hepatitis C screening negative, lipids normal, thyroid function normal  pending only allergy panel    If he thinks he still has a food allergy, I can refer him to an allergy specialist, please let me know    Future Appointments  2/10/2026  9:15 AM    Ankita Bonner MD    [facility] DEP

## (undated) DEVICE — SINGLE-USE BIOPSY FORCEPS: Brand: RADIAL JAW 4